# Patient Record
Sex: FEMALE | Race: WHITE | Employment: OTHER | URBAN - METROPOLITAN AREA
[De-identification: names, ages, dates, MRNs, and addresses within clinical notes are randomized per-mention and may not be internally consistent; named-entity substitution may affect disease eponyms.]

---

## 2019-01-31 ENCOUNTER — OFFICE VISIT (OUTPATIENT)
Dept: ORTHOPEDICS | Facility: CLINIC | Age: 44
End: 2019-01-31
Payer: COMMERCIAL

## 2019-01-31 ENCOUNTER — ANCILLARY PROCEDURE (OUTPATIENT)
Dept: GENERAL RADIOLOGY | Facility: CLINIC | Age: 44
End: 2019-01-31
Payer: COMMERCIAL

## 2019-01-31 VITALS
WEIGHT: 150 LBS | HEIGHT: 69 IN | SYSTOLIC BLOOD PRESSURE: 118 MMHG | BODY MASS INDEX: 22.22 KG/M2 | DIASTOLIC BLOOD PRESSURE: 76 MMHG

## 2019-01-31 DIAGNOSIS — M25.531 RIGHT WRIST PAIN: Primary | ICD-10-CM

## 2019-01-31 DIAGNOSIS — M25.531 RIGHT WRIST PAIN: ICD-10-CM

## 2019-01-31 PROCEDURE — 99203 OFFICE O/P NEW LOW 30 MIN: CPT | Performed by: FAMILY MEDICINE

## 2019-01-31 PROCEDURE — 73110 X-RAY EXAM OF WRIST: CPT | Mod: RT

## 2019-01-31 ASSESSMENT — MIFFLIN-ST. JEOR: SCORE: 1394.78

## 2019-01-31 NOTE — LETTER
1/31/2019         RE: Honey Munoz  [address] [facility]  [place] [address]        Dear Colleague,    Thank you for referring your patient, Honey Munoz, to the [facility]. Please see a copy of my visit note below.    ASSESSMENT & PLAN  Honey was seen today for pain.    Diagnoses and all orders for this visit:    Right wrist pain  -     XR Wrist Right G/E 3 Views; Future  -     order for DME; Equipment being ordered: right quick fit wrist    Pt is a 43-year-old female presenting with a one-month history of insidious onset gradually worsening radial sided wrist pain.  X-rays today showing possible calcified mass just distal to the radial styloid concerning for old radial styloid fracture.  She does have pain with palpation and compression of the side of the joint and negative Finkelstein's testing with ultrasound showing no significant free fluid in the first dorsal extensor compartment.  Low concern for de Quervain's tenosynovitis at this time.  Given this we will place patient in a wrist brace, rest from painful activities and follow-up for reevaluation in 3 weeks.  If not better at that time can consider further imaging including MRI versus trial of steroid injection.  Patient understands and agrees with plan.    -----    SUBJECTIVE  Honey Munoz is a/an 43 year old Left handed female who is seen as a self referral for evaluation of right wrist pain. The patient is seen by themselves.    Onset: 1 month(s) ago. Reports insidious onset without acute precipitating event.  No HO injury in the past.  Pt lifts weights usually free weights with .  Pt has HO torn common extensor tendon 4 yrs ago tx with PT, not tx with surgery.  Pain with supination.    Location of Pain: right radial side of hand   Rating of Pain at worst: 9/10  Rating of Pain Currently: 5/10  Worsened by: ice, lifting weights   Better with: wrap  Treatments tried: ibuprofen and wrap   Associated  "symptoms: no distal numbness or tingling; denies swelling or warmth  Orthopedic history: NO  Relevant surgical history: NO  Patient Social History: works managing farm, lot of computer work    Patient's past medical, surgical, social, and family histories were reviewed today and no changes are noted.    REVIEW OF SYSTEMS:  10 point ROS is negative other than symptoms noted above in HPI, Past Medical History or as stated below  Constitutional: NEGATIVE for fever, chills, change in weight  Skin: NEGATIVE for worrisome rashes, moles or lesions  GI/: NEGATIVE for bowel or bladder changes  Neuro: NEGATIVE for weakness, dizziness or paresthesias    OBJECTIVE:  /76   Ht 1.753 m (5' 9\")   Wt 68 kg (150 lb)   BMI 22.15 kg/m      General: healthy, alert and in no distress  HEENT: no scleral icterus or conjunctival erythema  Skin: no suspicious lesions or rash. No jaundice.  CV: regular rhythm by palpation  Resp: normal respiratory effort without conversational dyspnea   Psych: normal mood and affect  Gait: normal steady gait with appropriate coordination and balance  Neuro: Normal sensory exam of bilateral hands. Normal 2 pt discrimination.   MSK:  RIGHT WRIST  Inspection:    No swelling or obvious deformity or asymmetry  Palpation:    Tender about the distal radius. Remainder of bony and ligamentous line marks are nontender.     Metacarpals: normal    Thumb: normal    Fingers: normal  Range of Motion:    flexion limited slightly by pain, extension limited slightly by pain, pronation/supination limited slightly by pain, ulnar deviation limited slightly by pain, radial deviation limited slightly by pain  Strength:     strength limited slightly by pain flexion limited slightly by pain extension limited slightly by pain radial deviation limited slightly by pain ulnar deviation limited slightly by pain. Normal pinch strength.  Special Tests:    Positive: None    Negative: Finkelstein's, thenar eminence wasting. "     WRIST/HAND ULTRASOUND REPORT    CLINICAL INDICATION: RIGHT WRIST/HAND PAIN    TECHNIQUE: Real-time Ultrasound imaging utilizing high frequency transducer was performed on the Right wrist/hand. The scans were performed using a SonoSite ultrasound with a variable frequency (6.0-15.0 MHz) linear transducer.    FINDINGS:   Right Wrist/Hand  DORSAL: images demonstrate the extensor tendons in individual compartments with no enlargement due to inflammation.  The dorsal aspect of the 1st metacarpal joint reveals normal extensor pollicus tendons.  Calcifications noted intraarticularly distal to the radius    Independent visualization of the below image:  Recent Results (from the past 24 hour(s))   XR Wrist Right G/E 3 Views    Narrative    RIGHT WRIST THREE OR MORE VIEWS 1/31/2019 11:25 AM     HISTORY: Right wrist pain.    COMPARISON: None.      Impression    IMPRESSION: No acute or chronic bony abnormality. There are a few  small foci of amorphus soft tissue calcification projecting to the  radial side of the scaphoid bone. This is of uncertain etiology and  significance and may be extra-articular, but small intra-articular  loose bodies are not excluded.         Patient's conditions were thoroughly discussed during today's visit with greater than 50% of the visit spent counseling the patient with total time spent face-to-face with the patient being 30 minutes.    Carroll Huerta MD [facility]          Again, thank you for allowing me to participate in the care of your patient.        Sincerely,        Carroll Huerta MD

## 2019-01-31 NOTE — PATIENT INSTRUCTIONS
1) Wear wrist brace at night  2) Follow-up in 3 weeks if not improvedPatient Education     Wrist Splint: Velcro  A splint is designed to prevent movement of the bones, muscles and tendons. Velcro wrist splints are used because of their comfort and convenience for wrist and hand injuries. In certain conditions, the splint can be removed when bathing or changing clothes. The condition you are being treated for will determine how long you should wear the splint and if it is safe to remove your splint before your next visit. If you are unsure, ask your nurse or doctor.  When to seek medical advice  Call your healthcare provider right away if any of these occur:    Increased pain or swelling under the splint or in the hand or fingers    Fingers or hand becomes cold, blue, numb or tingly   Date Last Reviewed: 5/1/2018 2000-2018 [employer]. [address] [place] [id number]. All rights reserved. This information is not intended as a substitute for professional medical care. Always follow your healthcare professional's instructions.

## 2019-01-31 NOTE — PROGRESS NOTES
ASSESSMENT & PLAN  Honey was seen today for pain.    Diagnoses and all orders for this visit:    Right wrist pain  -     XR Wrist Right G/E 3 Views; Future  -     order for DME; Equipment being ordered: right quick fit wrist    Pt is a 43-year-old female presenting with a one-month history of insidious onset gradually worsening radial sided wrist pain.  X-rays today showing possible calcified mass just distal to the radial styloid concerning for old radial styloid fracture.  She does have pain with palpation and compression of the side of the joint and negative Finkelstein's testing with ultrasound showing no significant free fluid in the first dorsal extensor compartment.  Low concern for de Quervain's tenosynovitis at this time.  Given this we will place patient in a wrist brace, rest from painful activities and follow-up for reevaluation in 3 weeks.  If not better at that time can consider further imaging including MRI versus trial of steroid injection.  Patient understands and agrees with plan.    -----    SUBJECTIVE  Honey Munoz is a/an 43 year old Left handed female who is seen as a self referral for evaluation of right wrist pain. The patient is seen by themselves.    Onset: 1 month(s) ago. Reports insidious onset without acute precipitating event.  No HO injury in the past.  Pt lifts weights usually free weights with .  Pt has HO torn common extensor tendon 4 yrs ago tx with PT, not tx with surgery.  Pain with supination.    Location of Pain: right radial side of hand   Rating of Pain at worst: 9/10  Rating of Pain Currently: 5/10  Worsened by: ice, lifting weights   Better with: wrap  Treatments tried: ibuprofen and wrap   Associated symptoms: no distal numbness or tingling; denies swelling or warmth  Orthopedic history: NO  Relevant surgical history: NO  Patient Social History: works managing farm, lot of computer work    Patient's past medical, surgical, social, and family histories were  "reviewed today and no changes are noted.    REVIEW OF SYSTEMS:  10 point ROS is negative other than symptoms noted above in HPI, Past Medical History or as stated below  Constitutional: NEGATIVE for fever, chills, change in weight  Skin: NEGATIVE for worrisome rashes, moles or lesions  GI/: NEGATIVE for bowel or bladder changes  Neuro: NEGATIVE for weakness, dizziness or paresthesias    OBJECTIVE:  /76   Ht 1.753 m (5' 9\")   Wt 68 kg (150 lb)   BMI 22.15 kg/m     General: healthy, alert and in no distress  HEENT: no scleral icterus or conjunctival erythema  Skin: no suspicious lesions or rash. No jaundice.  CV: regular rhythm by palpation  Resp: normal respiratory effort without conversational dyspnea   Psych: normal mood and affect  Gait: normal steady gait with appropriate coordination and balance  Neuro: Normal sensory exam of bilateral hands. Normal 2 pt discrimination.   MSK:  RIGHT WRIST  Inspection:    No swelling or obvious deformity or asymmetry  Palpation:    Tender about the distal radius. Remainder of bony and ligamentous line marks are nontender.     Metacarpals: normal    Thumb: normal    Fingers: normal  Range of Motion:    flexion limited slightly by pain, extension limited slightly by pain, pronation/supination limited slightly by pain, ulnar deviation limited slightly by pain, radial deviation limited slightly by pain  Strength:     strength limited slightly by pain flexion limited slightly by pain extension limited slightly by pain radial deviation limited slightly by pain ulnar deviation limited slightly by pain. Normal pinch strength.  Special Tests:    Positive: None    Negative: Finkelstein's, thenar eminence wasting.     WRIST/HAND ULTRASOUND REPORT    CLINICAL INDICATION: RIGHT WRIST/HAND PAIN    TECHNIQUE: Real-time Ultrasound imaging utilizing high frequency transducer was performed on the Right wrist/hand. The scans were performed using a Bjond ultrasound with a variable " frequency (6.0-15.0 MHz) linear transducer.    FINDINGS:   Right Wrist/Hand  DORSAL: images demonstrate the extensor tendons in individual compartments with no enlargement due to inflammation.  The dorsal aspect of the 1st metacarpal joint reveals normal extensor pollicus tendons.  Calcifications noted intraarticularly distal to the radius    Independent visualization of the below image:  Recent Results (from the past 24 hour(s))   XR Wrist Right G/E 3 Views    Narrative    RIGHT WRIST THREE OR MORE VIEWS 1/31/2019 11:25 AM     HISTORY: Right wrist pain.    COMPARISON: None.      Impression    IMPRESSION: No acute or chronic bony abnormality. There are a few  small foci of amorphus soft tissue calcification projecting to the  radial side of the scaphoid bone. This is of uncertain etiology and  significance and may be extra-articular, but small intra-articular  loose bodies are not excluded.         Patient's conditions were thoroughly discussed during today's visit with greater than 50% of the visit spent counseling the patient with total time spent face-to-face with the patient being 30 minutes.    Carroll Huerta MD [facility]

## 2021-01-03 ENCOUNTER — HEALTH MAINTENANCE LETTER (OUTPATIENT)
Age: 46
End: 2021-01-03

## 2021-03-07 ENCOUNTER — HEALTH MAINTENANCE LETTER (OUTPATIENT)
Age: 46
End: 2021-03-07

## 2021-10-10 ENCOUNTER — HEALTH MAINTENANCE LETTER (OUTPATIENT)
Age: 46
End: 2021-10-10

## 2022-01-29 ENCOUNTER — HEALTH MAINTENANCE LETTER (OUTPATIENT)
Age: 47
End: 2022-01-29

## 2022-03-26 ENCOUNTER — HEALTH MAINTENANCE LETTER (OUTPATIENT)
Age: 47
End: 2022-03-26

## 2022-09-18 ENCOUNTER — HEALTH MAINTENANCE LETTER (OUTPATIENT)
Age: 47
End: 2022-09-18

## 2022-12-28 ENCOUNTER — APPOINTMENT (OUTPATIENT)
Dept: URBAN - METROPOLITAN AREA CLINIC 252 | Age: 47
Setting detail: DERMATOLOGY
End: 2023-01-03

## 2022-12-28 VITALS — WEIGHT: 158 LBS | HEIGHT: 69 IN | RESPIRATION RATE: 16 BRPM

## 2022-12-28 DIAGNOSIS — D22 MELANOCYTIC NEVI: ICD-10-CM

## 2022-12-28 DIAGNOSIS — L73.8 OTHER SPECIFIED FOLLICULAR DISORDERS: ICD-10-CM

## 2022-12-28 DIAGNOSIS — L71.8 OTHER ROSACEA: ICD-10-CM

## 2022-12-28 DIAGNOSIS — L82.1 OTHER SEBORRHEIC KERATOSIS: ICD-10-CM

## 2022-12-28 DIAGNOSIS — Z71.89 OTHER SPECIFIED COUNSELING: ICD-10-CM

## 2022-12-28 PROBLEM — D22.4 MELANOCYTIC NEVI OF SCALP AND NECK: Status: ACTIVE | Noted: 2022-12-28

## 2022-12-28 PROBLEM — D22.72 MELANOCYTIC NEVI OF LEFT LOWER LIMB, INCLUDING HIP: Status: ACTIVE | Noted: 2022-12-28

## 2022-12-28 PROBLEM — D22.71 MELANOCYTIC NEVI OF RIGHT LOWER LIMB, INCLUDING HIP: Status: ACTIVE | Noted: 2022-12-28

## 2022-12-28 PROBLEM — D22.61 MELANOCYTIC NEVI OF RIGHT UPPER LIMB, INCLUDING SHOULDER: Status: ACTIVE | Noted: 2022-12-28

## 2022-12-28 PROBLEM — D22.39 MELANOCYTIC NEVI OF OTHER PARTS OF FACE: Status: ACTIVE | Noted: 2022-12-28

## 2022-12-28 PROCEDURE — 99203 OFFICE O/P NEW LOW 30 MIN: CPT

## 2022-12-28 PROCEDURE — OTHER PRESCRIPTION: OTHER

## 2022-12-28 PROCEDURE — OTHER COUNSELING: OTHER

## 2022-12-28 RX ORDER — AZELAIC ACID 0.15 G/G
15% GEL TOPICAL BID
Qty: 50 | Refills: 1 | Status: ERX | COMMUNITY
Start: 2022-12-28

## 2022-12-28 ASSESSMENT — LOCATION DETAILED DESCRIPTION DERM
LOCATION DETAILED: RIGHT ANTERIOR PROXIMAL THIGH
LOCATION DETAILED: LEFT LATERAL TEMPLE
LOCATION DETAILED: LEFT LATERAL FOREHEAD
LOCATION DETAILED: RIGHT INFERIOR UPPER BACK
LOCATION DETAILED: RIGHT ANTERIOR SHOULDER
LOCATION DETAILED: LEFT ANTERIOR PROXIMAL THIGH
LOCATION DETAILED: LEFT MEDIAL FOREHEAD
LOCATION DETAILED: RIGHT SUPERIOR ANTERIOR NECK
LOCATION DETAILED: RIGHT INFERIOR CENTRAL MALAR CHEEK
LOCATION DETAILED: RIGHT MID-UPPER BACK

## 2022-12-28 ASSESSMENT — LOCATION SIMPLE DESCRIPTION DERM
LOCATION SIMPLE: RIGHT THIGH
LOCATION SIMPLE: RIGHT UPPER BACK
LOCATION SIMPLE: LEFT FOREHEAD
LOCATION SIMPLE: RIGHT CHEEK
LOCATION SIMPLE: LEFT TEMPLE
LOCATION SIMPLE: LEFT THIGH
LOCATION SIMPLE: RIGHT ANTERIOR NECK
LOCATION SIMPLE: RIGHT SHOULDER

## 2022-12-28 ASSESSMENT — LOCATION ZONE DERM
LOCATION ZONE: NECK
LOCATION ZONE: LEG
LOCATION ZONE: ARM
LOCATION ZONE: TRUNK
LOCATION ZONE: FACE

## 2023-05-07 ENCOUNTER — HEALTH MAINTENANCE LETTER (OUTPATIENT)
Age: 48
End: 2023-05-07

## 2023-07-30 ENCOUNTER — HEALTH MAINTENANCE LETTER (OUTPATIENT)
Age: 48
End: 2023-07-30

## 2024-01-03 ENCOUNTER — APPOINTMENT (OUTPATIENT)
Dept: URBAN - METROPOLITAN AREA CLINIC 252 | Age: 49
Setting detail: DERMATOLOGY
End: 2024-01-04

## 2024-01-03 VITALS — RESPIRATION RATE: 18 BRPM | HEIGHT: 69 IN | WEIGHT: 160 LBS

## 2024-01-03 DIAGNOSIS — L73.8 OTHER SPECIFIED FOLLICULAR DISORDERS: ICD-10-CM

## 2024-01-03 DIAGNOSIS — L90.5 SCAR CONDITIONS AND FIBROSIS OF SKIN: ICD-10-CM

## 2024-01-03 DIAGNOSIS — L82.1 OTHER SEBORRHEIC KERATOSIS: ICD-10-CM

## 2024-01-03 DIAGNOSIS — L71.8 OTHER ROSACEA: ICD-10-CM

## 2024-01-03 DIAGNOSIS — Z71.89 OTHER SPECIFIED COUNSELING: ICD-10-CM

## 2024-01-03 DIAGNOSIS — D22 MELANOCYTIC NEVI: ICD-10-CM

## 2024-01-03 PROBLEM — D23.39 OTHER BENIGN NEOPLASM OF SKIN OF OTHER PARTS OF FACE: Status: ACTIVE | Noted: 2024-01-03

## 2024-01-03 PROBLEM — D22.4 MELANOCYTIC NEVI OF SCALP AND NECK: Status: ACTIVE | Noted: 2024-01-03

## 2024-01-03 PROCEDURE — OTHER PRESCRIPTION: OTHER

## 2024-01-03 PROCEDURE — 99213 OFFICE O/P EST LOW 20 MIN: CPT

## 2024-01-03 PROCEDURE — OTHER COUNSELING: OTHER

## 2024-01-03 RX ORDER — AZELAIC ACID 0.15 G/G
15% GEL TOPICAL
Qty: 50 | Refills: 5 | Status: ERX

## 2024-01-03 ASSESSMENT — LOCATION SIMPLE DESCRIPTION DERM
LOCATION SIMPLE: LEFT CHEEK
LOCATION SIMPLE: RIGHT PRETIBIAL REGION
LOCATION SIMPLE: LEFT LOWER BACK
LOCATION SIMPLE: LEFT FOREHEAD
LOCATION SIMPLE: LEFT ANTERIOR NECK
LOCATION SIMPLE: RIGHT UPPER BACK

## 2024-01-03 ASSESSMENT — LOCATION ZONE DERM
LOCATION ZONE: NECK
LOCATION ZONE: TRUNK
LOCATION ZONE: FACE
LOCATION ZONE: LEG

## 2024-01-03 ASSESSMENT — LOCATION DETAILED DESCRIPTION DERM
LOCATION DETAILED: LEFT LATERAL FOREHEAD
LOCATION DETAILED: LEFT MEDIAL FOREHEAD
LOCATION DETAILED: RIGHT PROXIMAL PRETIBIAL REGION
LOCATION DETAILED: LEFT INFERIOR LATERAL NECK
LOCATION DETAILED: LEFT CENTRAL MALAR CHEEK
LOCATION DETAILED: RIGHT MID-UPPER BACK
LOCATION DETAILED: LEFT SUPERIOR LATERAL MIDBACK
LOCATION DETAILED: RIGHT INFERIOR UPPER BACK

## 2024-01-03 NOTE — PROCEDURE: COUNSELING
Patient Specific Counseling (Will Not Stick From Patient To Patient): Will switch back to the gel if the foam version isn’t covered
Detail Level: Zone
Detail Level: Detailed
Detail Level: Simple
Detail Level: Generalized
Patient Specific Counseling (Will Not Stick From Patient To Patient): *childhood injury

## 2024-06-18 ENCOUNTER — LAB REQUISITION (OUTPATIENT)
Dept: LAB | Facility: CLINIC | Age: 49
End: 2024-06-18

## 2024-06-18 DIAGNOSIS — Z13.220 ENCOUNTER FOR SCREENING FOR LIPOID DISORDERS: ICD-10-CM

## 2024-06-18 DIAGNOSIS — N95.1 MENOPAUSAL AND FEMALE CLIMACTERIC STATES: ICD-10-CM

## 2024-06-18 DIAGNOSIS — Z13.1 ENCOUNTER FOR SCREENING FOR DIABETES MELLITUS: ICD-10-CM

## 2024-06-18 DIAGNOSIS — Z13.29 ENCOUNTER FOR SCREENING FOR OTHER SUSPECTED ENDOCRINE DISORDER: ICD-10-CM

## 2024-06-18 LAB
CHOLEST SERPL-MCNC: 205 MG/DL
ESTRADIOL SERPL-MCNC: 155 PG/ML
FASTING STATUS PATIENT QL REPORTED: YES
FSH SERPL IRP2-ACNC: 6.2 MIU/ML
HDLC SERPL-MCNC: 80 MG/DL
HOLD SPECIMEN: NORMAL
LDLC SERPL CALC-MCNC: 112 MG/DL
NONHDLC SERPL-MCNC: 125 MG/DL
TRIGL SERPL-MCNC: 65 MG/DL
TSH SERPL DL<=0.005 MIU/L-ACNC: 2.37 UIU/ML (ref 0.3–4.2)

## 2024-06-18 PROCEDURE — 82670 ASSAY OF TOTAL ESTRADIOL: CPT | Performed by: OBSTETRICS & GYNECOLOGY

## 2024-06-18 PROCEDURE — 83036 HEMOGLOBIN GLYCOSYLATED A1C: CPT | Performed by: OBSTETRICS & GYNECOLOGY

## 2024-06-18 PROCEDURE — 84443 ASSAY THYROID STIM HORMONE: CPT | Performed by: OBSTETRICS & GYNECOLOGY

## 2024-06-18 PROCEDURE — 80061 LIPID PANEL: CPT | Performed by: OBSTETRICS & GYNECOLOGY

## 2024-06-18 PROCEDURE — 83001 ASSAY OF GONADOTROPIN (FSH): CPT | Performed by: OBSTETRICS & GYNECOLOGY

## 2024-06-19 LAB — HBA1C MFR BLD: 5.6 %

## 2024-07-14 ENCOUNTER — HEALTH MAINTENANCE LETTER (OUTPATIENT)
Age: 49
End: 2024-07-14

## 2025-07-14 ENCOUNTER — LAB REQUISITION (OUTPATIENT)
Dept: LAB | Facility: CLINIC | Age: 50
End: 2025-07-14

## 2025-07-14 DIAGNOSIS — Z12.4 ENCOUNTER FOR SCREENING FOR MALIGNANT NEOPLASM OF CERVIX: ICD-10-CM

## 2025-07-14 PROCEDURE — G0145 SCR C/V CYTO,THINLAYER,RESCR: HCPCS | Performed by: OBSTETRICS & GYNECOLOGY

## 2025-07-14 PROCEDURE — 87624 HPV HI-RISK TYP POOLED RSLT: CPT | Performed by: OBSTETRICS & GYNECOLOGY

## 2025-07-15 ENCOUNTER — LAB REQUISITION (OUTPATIENT)
Dept: LAB | Facility: CLINIC | Age: 50
End: 2025-07-15

## 2025-07-15 DIAGNOSIS — Z13.29 ENCOUNTER FOR SCREENING FOR OTHER SUSPECTED ENDOCRINE DISORDER: ICD-10-CM

## 2025-07-15 DIAGNOSIS — Z13.1 ENCOUNTER FOR SCREENING FOR DIABETES MELLITUS: ICD-10-CM

## 2025-07-15 DIAGNOSIS — Z13.220 ENCOUNTER FOR SCREENING FOR LIPOID DISORDERS: ICD-10-CM

## 2025-07-15 LAB
CHOLEST SERPL-MCNC: 191 MG/DL
EST. AVERAGE GLUCOSE BLD GHB EST-MCNC: 103 MG/DL
FASTING STATUS PATIENT QL REPORTED: YES
HBA1C MFR BLD: 5.2 %
HDLC SERPL-MCNC: 70 MG/DL
HPV HR 12 DNA CVX QL NAA+PROBE: NEGATIVE
HPV16 DNA CVX QL NAA+PROBE: NEGATIVE
HPV18 DNA CVX QL NAA+PROBE: NEGATIVE
HUMAN PAPILLOMA VIRUS FINAL DIAGNOSIS: NORMAL
LDLC SERPL CALC-MCNC: 108 MG/DL
NONHDLC SERPL-MCNC: 121 MG/DL
TRIGL SERPL-MCNC: 66 MG/DL
TSH SERPL DL<=0.005 MIU/L-ACNC: 2.58 UIU/ML (ref 0.3–4.2)

## 2025-07-15 PROCEDURE — 82465 ASSAY BLD/SERUM CHOLESTEROL: CPT | Performed by: OBSTETRICS & GYNECOLOGY

## 2025-07-15 PROCEDURE — 84443 ASSAY THYROID STIM HORMONE: CPT | Performed by: OBSTETRICS & GYNECOLOGY

## 2025-07-15 PROCEDURE — 83036 HEMOGLOBIN GLYCOSYLATED A1C: CPT | Performed by: OBSTETRICS & GYNECOLOGY

## 2025-07-17 LAB
BKR AP ASSOCIATED HPV REPORT: NORMAL
BKR LAB AP GYN ADEQUACY: NORMAL
BKR LAB AP GYN INTERPRETATION: NORMAL
BKR LAB AP LMP: NORMAL
BKR LAB AP PREVIOUS ABNL DX: NORMAL
BKR LAB AP PREVIOUS ABNORMAL: NORMAL
PATH REPORT.COMMENTS IMP SPEC: NORMAL
PATH REPORT.COMMENTS IMP SPEC: NORMAL
PATH REPORT.RELEVANT HX SPEC: NORMAL